# Patient Record
Sex: MALE | Race: WHITE
[De-identification: names, ages, dates, MRNs, and addresses within clinical notes are randomized per-mention and may not be internally consistent; named-entity substitution may affect disease eponyms.]

---

## 2024-11-20 ENCOUNTER — DASHBOARD ENCOUNTERS (OUTPATIENT)
Age: 79
End: 2024-11-20

## 2024-11-20 ENCOUNTER — OFFICE VISIT (OUTPATIENT)
Dept: URBAN - METROPOLITAN AREA CLINIC 54 | Facility: CLINIC | Age: 79
End: 2024-11-20
Payer: MEDICARE

## 2024-11-20 VITALS
WEIGHT: 172.6 LBS | HEART RATE: 53 BPM | TEMPERATURE: 98.3 F | BODY MASS INDEX: 24.71 KG/M2 | SYSTOLIC BLOOD PRESSURE: 150 MMHG | DIASTOLIC BLOOD PRESSURE: 66 MMHG | HEIGHT: 70 IN

## 2024-11-20 DIAGNOSIS — K86.2 PANCREATIC CYST: ICD-10-CM

## 2024-11-20 PROCEDURE — 99244 OFF/OP CNSLTJ NEW/EST MOD 40: CPT

## 2024-11-20 RX ORDER — FERROUS SULFATE 325(65) MG
1 TABLET TABLET ORAL
Status: ACTIVE | COMMUNITY

## 2024-11-20 RX ORDER — DOCUSATE SODIUM 100 MG/1
1 CAPSULE AS NEEDED CAPSULE ORAL ONCE A DAY
Status: ON HOLD | COMMUNITY

## 2024-11-20 RX ORDER — HYDRALAZINE HYDROCHLORIDE 25 MG/1
1 TABLET WITH FOOD TABLET, FILM COATED ORAL TWICE A DAY
Status: ACTIVE | COMMUNITY

## 2024-11-20 RX ORDER — TAMSULOSIN HYDROCHLORIDE 0.4 MG/1
1 CAPSULE CAPSULE ORAL ONCE A DAY
Status: ACTIVE | COMMUNITY

## 2024-11-20 RX ORDER — ROSUVASTATIN CALCIUM 20 MG/1
1 TABLET TABLET, COATED ORAL ONCE A DAY
Status: ACTIVE | COMMUNITY

## 2024-11-20 RX ORDER — METOPROLOL TARTRATE 25 MG/1
1 TABLET WITH FOOD TABLET, FILM COATED ORAL ONCE A DAY
Status: ACTIVE | COMMUNITY

## 2024-11-20 RX ORDER — CALCITRIOL 0.25 UG/1
1 CAPSULE CAPSULE ORAL
Status: ACTIVE | COMMUNITY

## 2024-11-20 RX ORDER — AMLODIPINE BESYLATE 10 MG/1
1 TABLET TABLET ORAL ONCE A DAY
Status: ACTIVE | COMMUNITY

## 2024-11-20 RX ORDER — PREDNISONE 5 MG/1
1 TABLET TABLET ORAL ONCE A DAY
Status: ON HOLD | COMMUNITY

## 2024-11-20 RX ORDER — ACETAMINOPHEN 325 MG/1
1 TABLET AS NEEDED TABLET, FILM COATED ORAL
Status: ACTIVE | COMMUNITY

## 2024-11-20 RX ORDER — ALBUTEROL SULFATE 90 UG/1
1 PUFF AS NEEDED AEROSOL, METERED RESPIRATORY (INHALATION)
Status: ON HOLD | COMMUNITY

## 2024-11-20 RX ORDER — SODIUM BICARBONATE TAB 650 MG 650 MG
AS DIRECTED TAB ORAL
Status: ON HOLD | COMMUNITY

## 2024-11-20 NOTE — HPI-TODAY'S VISIT:
11/20/24: Patient was referred by Dr. Jeremy Gaona for abnormal pancreatic imaging. A copy of this document will be sent to the provider. Pt is a 80 yo male with a PMH of CAD s/p CABG x3 in 2012, and CKD stage III3b/IV who presents with DIL for evaluation of abnormal imaging of pancreas. MRI abd w and w/o contrast was completed on 10/15/24 for kidney eval. Incidentally noted numerous cystic lesions throughout the pancreas measuring up to 5 mm without overt aggressive features. 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 There is no pancreatic ductal dilation. Attention on follow-up imaging in 1 year is recommended to ensure stability. Patient denies any personal or family hx of pancreatic disease, no fam hx of pancreatic cancer. Denies any symptoms of concern. No abd pain, n/v, jaundice, or unintentional weight loss. Labs in August showed macrocytic anemia with Hgb 10.3 MCV 99, iron 30, TIBC 213, TSAT 14%, ferritin 160.  Severe vitamin D deficiency at 11.3. Cr 2.8, GFR.  LFTs are not available.

## 2024-11-21 LAB
ALBUMIN/GLOBULIN RATIO: 2.1
ALBUMIN: 3.9
ALKALINE PHOSPHATASE: 96
ALT (SGPT): 3
AST (SGOT): 10
BILIRUBIN, DIRECT: 0.1
BILIRUBIN, INDIRECT: 0.2
BILIRUBIN, TOTAL: 0.3
GLOBULIN: 1.9
PROTEIN, TOTAL: 5.8

## 2024-11-26 ENCOUNTER — TELEPHONE ENCOUNTER (OUTPATIENT)
Dept: URBAN - METROPOLITAN AREA CLINIC 54 | Facility: CLINIC | Age: 79
End: 2024-11-26